# Patient Record
Sex: FEMALE | Race: WHITE
[De-identification: names, ages, dates, MRNs, and addresses within clinical notes are randomized per-mention and may not be internally consistent; named-entity substitution may affect disease eponyms.]

---

## 2018-03-30 ENCOUNTER — HOSPITAL ENCOUNTER (EMERGENCY)
Dept: HOSPITAL 68 - ERH | Age: 48
End: 2018-03-30
Payer: SELF-PAY

## 2018-03-30 VITALS — SYSTOLIC BLOOD PRESSURE: 136 MMHG | DIASTOLIC BLOOD PRESSURE: 71 MMHG

## 2018-03-30 VITALS — HEIGHT: 65 IN | WEIGHT: 160 LBS | BODY MASS INDEX: 26.66 KG/M2

## 2018-03-30 DIAGNOSIS — R55: Primary | ICD-10-CM

## 2018-03-30 DIAGNOSIS — R07.9: ICD-10-CM

## 2018-03-30 LAB
ABSOLUTE GRANULOCYTE CT: 6.5 /CUMM (ref 1.4–6.5)
BASOPHILS # BLD: 0 /CUMM (ref 0–0.2)
BASOPHILS NFR BLD: 0.3 % (ref 0–2)
EOSINOPHIL # BLD: 0.1 /CUMM (ref 0–0.7)
EOSINOPHIL NFR BLD: 0.7 % (ref 0–5)
ERYTHROCYTE [DISTWIDTH] IN BLOOD BY AUTOMATED COUNT: 12.6 % (ref 11.5–14.5)
GRANULOCYTES NFR BLD: 70.9 % (ref 42.2–75.2)
HCT VFR BLD CALC: 35.3 % (ref 37–47)
LYMPHOCYTES # BLD: 2.1 /CUMM (ref 1.2–3.4)
MCH RBC QN AUTO: 30.9 PG (ref 27–31)
MCHC RBC AUTO-ENTMCNC: 34.8 G/DL (ref 33–37)
MCV RBC AUTO: 88.7 FL (ref 81–99)
MONOCYTES # BLD: 0.5 /CUMM (ref 0.1–0.6)
PLATELET # BLD: 200 /CUMM (ref 130–400)
PMV BLD AUTO: 10.3 FL (ref 7.4–10.4)
RED BLOOD CELL CT: 3.98 /CUMM (ref 4.2–5.4)
WBC # BLD AUTO: 9.1 /CUMM (ref 4.8–10.8)

## 2018-03-30 PROCEDURE — G0480 DRUG TEST DEF 1-7 CLASSES: HCPCS

## 2018-03-30 NOTE — RADIOLOGY REPORT
EXAMINATION:
XR CHEST
 
CLINICAL INFORMATION:
Chest pain.
 
COMPARISON:
Chest x-ray September 27, 2012
 
TECHNIQUE:
2 views of the chest were obtained.
 
FINDINGS:
No significant abnormality is noted involving the heart, lungs, mediastinum,
bony thorax or soft tissues.
 
IMPRESSION:
Unremarkable examination.

## 2018-03-30 NOTE — ED SYNCOPE COMPLAINT
History of Present Illness
 
General
Chief Complaint: Chest Pain
Stated Complaint: SYNCOPY,C/P
Source: patient
Exam Limitations: no limitations
 
Vital Signs & Intake/Output
Vital Signs & Intake/Output
 Vital Signs
 
 
Date Time Temp Pulse Resp B/P B/P Pulse O2 O2 Flow FiO2
 
     Mean Ox Delivery Rate 
 
 0533 98.6 78 16 136/71  97 Room Air  
 
 0325 96.9 67 16 116/70  99 Room Air  
 
 0015 98.7 62 18 121/72  99   
 
 
 
Allergies
Coded Allergies:
NO KNOWN ALLERGIES (12)
 
Reconcile Medications
Alprazolam 0.25 MG TABLET   1 TAB PO DAILY AS NEEDED MENTAL HEALTH  (Reported)
Cyclobenzaprine HCl 5 MG TABLET   1 TAB PO QPM PRN PAIN
Fluoxetine HCl 40 MG CAPSULE   1 CAP PO DAILY MENTAL HEALTH  (Reported)
Ibuprofen 600 MG TABLET   1 TAB PO TIDPRN PAIN  (Reported)
     with food
Ibuprofen (Motrin 600 MG Tab) 600 MG TAB   1 TAB PO Q6P PRN PAIN
Omeprazole 40 MG CAPSULE.DR   1 CAP PO DAILY ACID REFLUX  (Reported)
Sulfamethoxazole/Trimethoprim (Bactrim Ds Tablet) 800 MG-160 MG TABLET   1 TAB 
PO BID UTI
 
Triage Note:
48/F BIBA FROM HOME WITH C/C OF SYNCOPAL EPISODE
 IN THE BATHROOM. PT ARRIVES ALERT AND ORIENTED TO
 PERSON, PLACE AND TIME. REPORTS THAT "AS SOON AS I
 FELT LIKE PASSING OUT, I JUST SAT DOWN AND I DONT
 KNOW HOW LONG I WAS OUT FOR" REPORTS PT WAS NAUSEA
 PRIOR TO PASSING OUT. DENIES HITTING HEAD.  PT IS
 ALSO C/O RIGHT CHEST WALL PAIN 5/10 FOR 3 WEEKS.
 PLACED ON HEART MONITOR. SINUS RHYTHM IN 70'S. O2
 SAT 99%RA. ORTHOSTATIC NEGATIVE. NO C/O DIFFICULTY
 BREATHING. NO DIAPHORESIS. NO C/O GI/ SYMPTOMS.
 PT HAS PREHOSPITAL IV #20 ON RIGHT AC. PT HAD
 325MG ASPIRIN PO AND 1 NITRO SL ON ROUTE.  PT
 REPORTS MEDS EFFECTIVE. AWAITING EVAL.
Triage Nurses Notes Reviewed? yes
HPI:
Patient presents for evaluation of a fainting episode that occurred between 11:
30 and 1145 last night.  Patient states that while at home she began to feel 
sick.  She went to the bathroom and then felt she was going to pass out.  
Ultimately she did pass out and woke up on the bathroom floor.  She is not sure 
how long she was there.  Upon awakening the patient went back to her room but 
still did not feel well (dizzy and nauseous).  Her son came to her room and 
called 911.  Patient states that she has been having intermittent chest pain 
episodes over the past 2 weeks consisting of a right sided "pain" but does not 
change with deep inspiration or movement.  She describes the pain as mild in 
intensity.  She denies cigarette smoking or drug use.  She has occasional 
alcohol.
 
Past History
 
Travel History
Traveled to [place] past 21 day No
 
Medical History
Any Pertinent Medical History? see below for history
Neurological: NONE
EENT: NONE
Cardiovascular: NONE
Respiratory: NONE
Gastrointestinal: NONE
Hepatic: NONE
Renal: NONE
Musculoskeletal: NONE
Psychiatric: depression
Endocrine: NONE
Blood Disorders: NONE
Cancer(s): NONE
GYN/Reproductive: NONE
 
Surgical History
Surgical History: non-contributory
 
Psychosocial History
What is your primary language English
Tobacco Use: Never used
ETOH Use: occasional use
Illicit Drug Use: denies illicit drug use
 
Family History
Hx Contributory? No
 
Review of Systems
 
Review of Systems
Constitutional:
Reports: no symptoms. 
EENTM:
Reports: no symptoms. 
Respiratory:
Reports: no symptoms. 
Cardiovascular:
Reports: chest pain, syncope. 
GI:
Reports: no symptoms. 
Genitourinary:
Reports: no symptoms. 
Musculoskeletal:
Reports: no symptoms. 
Skin:
Reports: no symptoms. 
Neurological/Psychological:
Reports: no symptoms. 
All Other Systems: Reviewed and Negative
 
Physical Exam
 
Physical Exam
Cranial Nerves: SEE BELOW
Comments:
Gen.: Well-nourished, well-developed, no acute respiratory distress.
Head: Normocephalic, atraumatic.
Eyes: Normal inspection bilaterally
Ears: Normal inspection bilaterally
Nose: Normal inspection
Throat/mouth : Moist mucosa 
Neck: Supple, full range of motion, no goiter
Heart: Regular rate and rhythm, no murmurs rubs or gallops
Lungs: Clear to auscultation bilaterally with normal air entry
Chest: Nontender
Back: Normal range of motion, nontender
Abdomen: Soft, nontender, nondistended, normal bowel sounds
Extremities: Normal range of motion grossly, equal radial pulses, no cyanosis 
clubbing or edema
Neurologic: Cranial nerves grossly intact, speech is clear
Skin: warm and dry, no rashes in the area of pain
Psychiatric: Calm, cooperative, no apparent delusions or hallucinations
 
 
Core Measures
ACS in differential dx? No
CVA/TIA Diagnosis: No
Sepsis Present: No
Sepsis Focused Exam Completed? No
 
Progress
Differential Diagnosis: MUSCULOSKELETAL PAIN, CORONARY ARTERY DISEASE, ACID 
REFLUX, BILIARY COLIC, RENAL COLIC, SHINGLES
Plan of Care:
 Orders
 
 
Procedure Date/time Status
 
Heart Healthy Diet  B Active
 
TROPONIN LEVEL 530 Complete
 
 Active
 
MISTAKE 125 Active
 
URINE DRUG SCREEN FOR ER ONLY 125 Complete
 
URINALYSIS 125 Complete
 
TROPONIN LEVEL 125 Complete
 
ETHANOL 125 Complete
 
COMPREHENSIVE METABOLIC PANEL 125 Complete
 
CBC WITHOUT DIFFERENTIAL 125 Complete
 
 Active
 
 
 Current Medications
 
 
  Sig/Derek Start time  Last
 
Medication Dose  Stop Time Status Admin
 
Ketorolac  30 MG ONE  CAN 
 
Tromethamine   331  
 
(Toradol)     
 
 
 Laboratory Tests
 
 
 
18:
Troponin I < 0.01
 
18 0310:
Urine Opiates Screen < 100, Methadone Screen < 40, Barbiturate Screen < 60, Ur 
Phencyclidine Scrn < 6.00, Amphetamines Screen < 100, U Benzodiazepines Scrn < 
85, Urine Cocaine Screen 75, Urine Cannabis Screen 38.80, Urine Color YEL, Urine
Clarity CLEAR, Urine pH 6.0, Ur Specific Gravity 1.025, Urine Protein NEG, Urine
Ketones TRACE  H, Urine Nitrite NEG, Urine Bilirubin NEG, Urine Urobilinogen 1.0
, Ur Leukocyte Esterase NEG, Ur Microscopic EXAM NOT REQUIRED, Urine Hemoglobin 
NEG, Urine Glucose NEG
 
18 0135:
Anion Gap 16, Estimated GFR > 60, BUN/Creatinine Ratio 20.0, Glucose 104  H, 
Calcium 9.6, Total Bilirubin 0.7, AST 27, ALT 48, Alkaline Phosphatase 46, 
Troponin I < 0.01, Total Protein 7.0, Albumin 4.1, Globulin 2.9, Albumin/
Globulin Ratio 1.4, CBC w Diff NO MAN DIFF REQ, RBC 3.98  L, MCV 88.7, MCH 30.9,
MCHC 34.8, RDW 12.6, MPV 10.3, Gran % 70.9, Lymphocytes % 23.0, Monocytes % 5.1,
Eosinophils % 0.7, Basophils % 0.3, Absolute Granulocytes 6.5, Absolute 
Lymphocytes 2.1, Absolute Monocytes 0.5, Absolute Eosinophils 0.1, Absolute 
Basophils 0, Serum Alcohol < 10.0
 
Diagnostic Imaging:
Discussed w/RAD: Radiology Read. 
CXR Impression: PATIENT: VICTORINO PERDOMO  MEDICAL RECORD NO: [id number] PRESENT AGE: 
48  PATIENT ACCOUNT NO: [id number] : 70  LOCATION: [facility] ORDERING PHYSICIAN:
Neida COTTON     SERVICE DATE:  EXAM TYPE: RAD - XRY-CHEST 
XRAY, TWO VIEWS EXAMINATION: XR CHEST CLINICAL INFORMATION: Chest pain. 
COMPARISON: Chest x-ray 2012 TECHNIQUE: 2 views of the chest were 
obtained. FINDINGS: No significant abnormality is noted involving the heart, 
lungs, mediastinum, bony thorax or soft tissues. IMPRESSION: Unremarkable 
examination. DICTATED BY: Dar Fair MD  DATE/TIME DICTATED:18 
:RAD.[email or web address]  DATE/TIME TRANSCRIBED:18 CONFIDENTIAL, 
DO NOT COPY WITHOUT APPROPRIATE AUTHORIZATION.  <Electronically signed in Other 
Vendor System>                                                                  
                     SIGNED BY: Dar Fair MD 18
Initial ED EKG: NSR, rate (69)
Prior EKG: unchanged
Comments:
3/30/2018 6:55:30 AM I discussed this patient's case (and the Lilian syncope rule)
with Dr. Serafin Subramanian who feels that the patient is stable for outpatient 
management.  He feels that the syncopal episode is likely vasovagal and that the
chest pain episodes are unrelated phenomenon.
 
Departure
 
Departure
Disposition: HOME OR SELF CARE
Condition: Stable
Clinical Impression
Primary Impression: Syncope
Qualifiers:  Syncope type: unspecified Qualified Code: R55 - Syncope and 
collapse
Secondary Impressions: 
Chest pain
Qualifiers:  Chest pain type: unspecified Qualified Code: R07.9 - Chest pain, 
unspecified
 
Referrals:
Elaine Pappas MD (PCP/Family)
 
Additional Instructions:
Avoid sudden positional changes and maintain a good fluid intake.  Follow-up 
with your primary care physician this week for reevaluation and possible 
referral to a cardiologist for additional testing such as echocardiogram.  
Return if any further episodes or any other concerns or worsening.
 
Please note that there might be incidental findings in your evaluation that are 
unrelated to the current emergency department visit.  Please notify your primary
care doctor about this emergency department visit in order to obtain and review 
all of the testing performed so that these incidental findings can be monitored 
as needed.
 
If you had an x-ray performed, please understand that some fractures may not be 
seen on the initial set of x-rays.  If your symptoms persist you might need a 
repeat set of x-rays to check for such a fracture.
 
If you had a laceration evaluated, please understand that foreign bodies such as
glass or wood may not be visible to the naked eye or on plain x-rays.  If the 
wound becomes red, swollen, increasingly more painful or if there is any 
drainage from the wound, please have it reevaluated by a physician for the 
possibility of a retained foreign body.
 
If you're unable to follow up as outlined in the discharge instructions please 
return to the emergency department.
 
Thank you for choosing the [facility] Emergency Department for your care.
It was a pleasure to serve you today.
 
Guille Cain M.D.
[facility]
 
Departure Forms:
Customer Survey
General Discharge Information